# Patient Record
Sex: FEMALE | Race: WHITE | NOT HISPANIC OR LATINO
[De-identification: names, ages, dates, MRNs, and addresses within clinical notes are randomized per-mention and may not be internally consistent; named-entity substitution may affect disease eponyms.]

---

## 2022-04-07 PROBLEM — Z00.00 ENCOUNTER FOR PREVENTIVE HEALTH EXAMINATION: Status: ACTIVE | Noted: 2022-04-07

## 2022-05-10 ENCOUNTER — APPOINTMENT (OUTPATIENT)
Dept: PULMONOLOGY | Facility: CLINIC | Age: 87
End: 2022-05-10

## 2024-01-11 ENCOUNTER — EMERGENCY (EMERGENCY)
Facility: HOSPITAL | Age: 89
LOS: 1 days | Discharge: LEFT BEFORE DISCHARGE | End: 2024-01-11
Attending: EMERGENCY MEDICINE
Payer: COMMERCIAL

## 2024-01-11 VITALS
RESPIRATION RATE: 18 BRPM | WEIGHT: 130.07 LBS | HEIGHT: 61 IN | OXYGEN SATURATION: 95 % | HEART RATE: 92 BPM | DIASTOLIC BLOOD PRESSURE: 82 MMHG | SYSTOLIC BLOOD PRESSURE: 168 MMHG | TEMPERATURE: 100 F

## 2024-01-11 PROCEDURE — 73130 X-RAY EXAM OF HAND: CPT

## 2024-01-11 PROCEDURE — 99284 EMERGENCY DEPT VISIT MOD MDM: CPT

## 2024-01-11 PROCEDURE — 99283 EMERGENCY DEPT VISIT LOW MDM: CPT | Mod: 25

## 2024-01-11 PROCEDURE — 73130 X-RAY EXAM OF HAND: CPT | Mod: 26,LT

## 2024-01-11 RX ORDER — IBUPROFEN 200 MG
600 TABLET ORAL ONCE
Refills: 0 | Status: DISCONTINUED | OUTPATIENT
Start: 2024-01-11 | End: 2024-01-14

## 2024-01-11 NOTE — ED PROVIDER NOTE - PHYSICAL EXAMINATION
General: No apparent distress. Nontoxic, well appearing. Speaking in full sentences.  Head: NC/AT. PERRLA with EOMI. Conjunctiva clear. Atraumatic face. Poor dentition, ttp to 2nd bicuspid tooth of L mandible without any areas of induration or fluctuance, no obvious lesions, asbcess or facial swelling. Nares patent. Oropharynx clear.  Neck: Supple. Full active to passive ROM. No midline TTP or step off.  Cardiac: RRR, no MGR  Pulmonary: CTAB, no increased WOB  Abdominal: soft, nontender, nondistended, no peritoneal signs  Neurologic: No gross focal sensory or motor deficits. Moves all 4 extremities, ambulates without assistance during encounter.   Musculoskeletal: +chronic anterior dislocation to R shoulder (> 6yrs ago) with ROM limited with abduction, otherwise full active to passive nonpainful ROM. Strength and ROM appropriate to all other extremities and joints. No extremity swelling. No T/L spine midline or paraspinal TTP or stepoff.   Skin: Color appropriate for race. Intact, warm, and well-perfused. +ecchymosis to L medial bicep and to thenar aspect of L head, no scaphoid ttp. Radial pulses 2+ b/l with full ROM of both wrists and digits.  Psychiatric: A&O x4. Anxious appearing.
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
## 2024-01-11 NOTE — ED PROVIDER NOTE - PROGRESS NOTE DETAILS
Patient initially approached me at workstations stating that she wanted ibuprofen for her tooth pain, for which I ordered, she stated that she wanted right that minute however x-ray was taking her for her hand imaging.  This time she expressed to me that she wants to leaveand does not wish to continue her workup.  I informed her that she would receive her ibuprofen upon returning from x-ray.  Upon returning from x-ray, patient is tearful at workstation stating that she is leaving, I strongly recommended that she stays in continues her medical evaluation and social work evaluation, patient tearful and eloped at this time.
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
## 2024-01-11 NOTE — ED ADULT NURSE NOTE - NSFALLHARMRISKINTERV_ED_ALL_ED
Assistance OOB with selected safe patient handling equipment if applicable/Assistance with ambulation/Communicate risk of Fall with Harm to all staff, patient, and family/Provide visual cue: red socks, yellow wristband, yellow gown, etc/Reinforce activity limits and safety measures with patient and family/Bed in lowest position, wheels locked, appropriate side rails in place/Call bell, personal items and telephone in reach/Instruct patient to call for assistance before getting out of bed/chair/stretcher/Non-slip footwear applied when patient is off stretcher/[place] to call system/Physically safe environment - no spills, clutter or unnecessary equipment/Purposeful Proactive Rounding/Room/bathroom lighting operational, light cord in reach Assistance OOB with selected safe patient handling equipment if applicable/Assistance with ambulation/Communicate risk of Fall with Harm to all staff, patient, and family/Provide visual cue: red socks, yellow wristband, yellow gown, etc/Reinforce activity limits and safety measures with patient and family/Bed in lowest position, wheels locked, appropriate side rails in place/Call bell, personal items and telephone in reach/Instruct patient to call for assistance before getting out of bed/chair/stretcher/Non-slip footwear applied when patient is off stretcher/[place] to call system/Physically safe environment - no spills, clutter or unnecessary equipment/Purposeful Proactive Rounding/Room/bathroom lighting operational, light cord in reach

## 2024-07-24 ENCOUNTER — RESULT REVIEW (OUTPATIENT)
Age: 89
End: 2024-07-24

## 2024-07-29 ENCOUNTER — TRANSCRIPTION ENCOUNTER (OUTPATIENT)
Age: 89
End: 2024-07-29